# Patient Record
Sex: MALE | NOT HISPANIC OR LATINO | Employment: FULL TIME | URBAN - METROPOLITAN AREA
[De-identification: names, ages, dates, MRNs, and addresses within clinical notes are randomized per-mention and may not be internally consistent; named-entity substitution may affect disease eponyms.]

---

## 2022-05-26 ENCOUNTER — OFFICE VISIT (OUTPATIENT)
Dept: URGENT CARE | Facility: PHYSICIAN GROUP | Age: 32
End: 2022-05-26
Payer: COMMERCIAL

## 2022-05-26 VITALS
BODY MASS INDEX: 20.32 KG/M2 | HEART RATE: 80 BPM | WEIGHT: 150 LBS | TEMPERATURE: 98.9 F | SYSTOLIC BLOOD PRESSURE: 110 MMHG | RESPIRATION RATE: 18 BRPM | OXYGEN SATURATION: 98 % | DIASTOLIC BLOOD PRESSURE: 76 MMHG | HEIGHT: 72 IN

## 2022-05-26 DIAGNOSIS — J02.9 SORE THROAT: ICD-10-CM

## 2022-05-26 DIAGNOSIS — J02.0 STREP THROAT: Primary | ICD-10-CM

## 2022-05-26 LAB
INT CON NEG: NORMAL
INT CON POS: NORMAL
S PYO AG THROAT QL: POSITIVE

## 2022-05-26 PROCEDURE — 99203 OFFICE O/P NEW LOW 30 MIN: CPT | Performed by: NURSE PRACTITIONER

## 2022-05-26 PROCEDURE — 87880 STREP A ASSAY W/OPTIC: CPT | Performed by: NURSE PRACTITIONER

## 2022-05-26 RX ORDER — AZITHROMYCIN 250 MG/1
TABLET, FILM COATED ORAL
Qty: 6 TABLET | Refills: 0 | Status: SHIPPED | OUTPATIENT
Start: 2022-05-26 | End: 2023-09-09

## 2022-05-26 ASSESSMENT — ENCOUNTER SYMPTOMS
DIARRHEA: 0
HOARSE VOICE: 0
TROUBLE SWALLOWING: 0
SWOLLEN GLANDS: 1
HEADACHES: 0
ABDOMINAL PAIN: 0
COUGH: 0
NECK PAIN: 0
STRIDOR: 0
VOMITING: 0
SHORTNESS OF BREATH: 0

## 2022-05-26 NOTE — LETTER
May 26, 2022       Patient: Casey Sean Mentzer   YOB: 1990   Date of Visit: 5/26/2022         To Whom It May Concern:    In my medical opinion, I recommend that Casey Mentzer return to full duty, no restrictions on 05/28/22            Sincerely,          SAIDA Mariano.  Electronically Signed

## 2022-05-26 NOTE — PROGRESS NOTES
Casey Sean Mentzer is a 31 y.o. male who presents for Pharyngitis (Since yesterday. )      Pharyngitis   This is a new problem. The current episode started in the past 7 days. The problem has been gradually worsening. Neither side of throat is experiencing more pain than the other. There has been no fever. The pain is at a severity of 4/10. The pain is moderate. Associated symptoms include swollen glands. Pertinent negatives include no abdominal pain, congestion, coughing, diarrhea, drooling, ear discharge, ear pain, headaches, hoarse voice, plugged ear sensation, neck pain, shortness of breath, stridor, trouble swallowing or vomiting. He has had no exposure to strep or mono. He has tried NSAIDs and gargles for the symptoms. The treatment provided mild relief.       Review of Systems   HENT: Negative for congestion, drooling, ear discharge, ear pain, hoarse voice and trouble swallowing.    Respiratory: Negative for cough, shortness of breath and stridor.    Gastrointestinal: Negative for abdominal pain, diarrhea and vomiting.   Musculoskeletal: Negative for neck pain.   Neurological: Negative for headaches.       Allergies:       Allergies   Allergen Reactions   • Amoxicillin        PMSFS Hx:  Past Medical History:   Diagnosis Date   • Migraines    • Tinea versicolor      History reviewed. No pertinent surgical history.  Family History   Problem Relation Age of Onset   • Genetic Disorder Mother         migraines     Social History     Tobacco Use   • Smoking status: Former Smoker     Packs/day: 0.30     Types: Cigarettes     Quit date: 7/1/2011     Years since quitting: 10.9   • Smokeless tobacco: Never Used   Substance Use Topics   • Alcohol use: Yes       Problems:   There is no problem list on file for this patient.      Medications:   Current Outpatient Medications on File Prior to Visit   Medication Sig Dispense Refill   • sumatriptan (IMITREX) 100 MG tablet Take 1 Tab by mouth Once PRN for Migraine (may repeat  after 2 hours if needed. ) for 1 dose. 9 Tab 3     No current facility-administered medications on file prior to visit.          Objective:     /76   Pulse 80   Temp 37.2 °C (98.9 °F) (Temporal)   Resp 18   Ht 1.829 m (6')   Wt 68 kg (150 lb)   SpO2 98%   BMI 20.34 kg/m²     Physical Exam  Vitals and nursing note reviewed.   Constitutional:       General: He is not in acute distress.     Appearance: Normal appearance. He is well-developed and normal weight.   HENT:      Head: Normocephalic.      Nose: Nose normal.      Mouth/Throat:      Mouth: Mucous membranes are moist.      Pharynx: Posterior oropharyngeal erythema present. No oropharyngeal exudate.   Eyes:      Pupils: Pupils are equal, round, and reactive to light.   Cardiovascular:      Rate and Rhythm: Normal rate and regular rhythm.   Pulmonary:      Effort: Pulmonary effort is normal.   Musculoskeletal:      Cervical back: Normal range of motion and neck supple.   Lymphadenopathy:      Head:      Right side of head: Tonsillar adenopathy present.      Left side of head: No tonsillar adenopathy.      Cervical: Cervical adenopathy present.      Right cervical: Superficial cervical adenopathy present.      Left cervical: Superficial cervical adenopathy present.   Skin:     General: Skin is warm and dry.   Neurological:      Mental Status: He is alert and oriented to person, place, and time.   Psychiatric:         Speech: Speech normal.         Behavior: Behavior normal. Behavior is cooperative.     Rapid Strep A : positive      Assessment /Associated Orders:      1. Strep throat  azithromycin (ZITHROMAX) 250 MG Tab   2. Sore throat  POCT Rapid Strep A       Medical Decision Making:    Pt is clinically stable at today's acute urgent care visit.  No acute distress noted. Appropriate for outpatient management at this time.   Acute problem today with uncertain prognosis.    Educated in proper administration of medication(s) ordered today including  safety, possible SE, risks, benefits, rationale and alternatives to therapy.   Keep well hydrated  Salt water gargles BID and prn. Suggested 1/4 to 1/2 teaspoon (1.5 to 3.0 g) of salt per one cup (8 ounces or 250 mL) of warm water.   OTC throat analgesic spray or lozenge of choice prn throat pain. Dosage and directions per   Educated in infection control practices.       FU prn new or worsening sx. Or if no improvement in symptoms.

## 2022-10-24 ENCOUNTER — HOSPITAL ENCOUNTER (OUTPATIENT)
Facility: MEDICAL CENTER | Age: 32
End: 2022-10-24
Attending: PHYSICIAN ASSISTANT
Payer: COMMERCIAL

## 2022-10-24 ENCOUNTER — OFFICE VISIT (OUTPATIENT)
Dept: URGENT CARE | Facility: PHYSICIAN GROUP | Age: 32
End: 2022-10-24
Payer: COMMERCIAL

## 2022-10-24 VITALS
SYSTOLIC BLOOD PRESSURE: 110 MMHG | RESPIRATION RATE: 12 BRPM | TEMPERATURE: 98.7 F | OXYGEN SATURATION: 98 % | HEART RATE: 97 BPM | HEIGHT: 72 IN | DIASTOLIC BLOOD PRESSURE: 64 MMHG | WEIGHT: 154 LBS | BODY MASS INDEX: 20.86 KG/M2

## 2022-10-24 DIAGNOSIS — R30.0 DYSURIA: ICD-10-CM

## 2022-10-24 LAB
AMBIGUOUS DTTM AMBI4: NORMAL
AMBIGUOUS DTTM AMBI4: NORMAL
APPEARANCE UR: NORMAL
BILIRUB UR STRIP-MCNC: NEGATIVE MG/DL
COLOR UR AUTO: NORMAL
GLUCOSE UR STRIP.AUTO-MCNC: NEGATIVE MG/DL
KETONES UR STRIP.AUTO-MCNC: NORMAL MG/DL
LEUKOCYTE ESTERASE UR QL STRIP.AUTO: NEGATIVE
NITRITE UR QL STRIP.AUTO: NEGATIVE
PH UR STRIP.AUTO: 5.5 [PH] (ref 5–8)
PROT UR QL STRIP: NEGATIVE MG/DL
RBC UR QL AUTO: NEGATIVE
SP GR UR STRIP.AUTO: 1.02
UROBILINOGEN UR STRIP-MCNC: 1 MG/DL

## 2022-10-24 PROCEDURE — 87491 CHLMYD TRACH DNA AMP PROBE: CPT

## 2022-10-24 PROCEDURE — 87591 N.GONORRHOEAE DNA AMP PROB: CPT

## 2022-10-24 PROCEDURE — 99213 OFFICE O/P EST LOW 20 MIN: CPT | Performed by: PHYSICIAN ASSISTANT

## 2022-10-24 PROCEDURE — 81002 URINALYSIS NONAUTO W/O SCOPE: CPT | Performed by: PHYSICIAN ASSISTANT

## 2022-10-24 PROCEDURE — 87086 URINE CULTURE/COLONY COUNT: CPT

## 2022-10-24 NOTE — PROGRESS NOTES
Subjective:   Casey Sean Mentzer is a 32 y.o. male who presents for UTI (Pain with urination x2 days )      HPI  The patient presents to the Urgent Care with complaints of burning urination onset 2 days ago.  He reports of dysuria every time he urinates.  He has no other symptoms.  He denies any known trauma or injury.  He is sexually active in a monogamous relationship with a female unprotected.  Denies any lesions, rash, penile discharge or itching.  Denies any urinary urgency or frequency.  Denies any fevers, chills, abdominal pain, nausea, vomiting, flank pain, testicle pain.        Medications:    azithromycin Tabs  sumatriptan    Allergies: Amoxicillin    Problem List: Casey Sean Mentzer does not have a problem list on file.    Surgical History:  No past surgical history on file.    Past Social Hx: Casey Sean Mentzer  reports that he quit smoking about 11 years ago. His smoking use included cigarettes. He smoked an average of .3 packs per day. He has never used smokeless tobacco. He reports current alcohol use. He reports that he does not use drugs.     Past Family Hx:  Casey Sean Mentzer family history includes Genetic Disorder in his mother.     Problem list, medications, and allergies reviewed by myself today in Epic.     Objective:     /64   Pulse 97   Temp 37.1 °C (98.7 °F) (Temporal)   Resp 12   Ht 1.829 m (6')   Wt 69.9 kg (154 lb)   SpO2 98%   BMI 20.89 kg/m²     Physical Exam  Vitals reviewed.   Constitutional:       General: He is not in acute distress.     Appearance: Normal appearance. He is not ill-appearing or toxic-appearing.   Eyes:      Conjunctiva/sclera: Conjunctivae normal.      Pupils: Pupils are equal, round, and reactive to light.   Cardiovascular:      Rate and Rhythm: Normal rate.   Pulmonary:      Effort: Pulmonary effort is normal.   Abdominal:      General: Abdomen is flat. Bowel sounds are normal. There is no distension.      Palpations: Abdomen is soft. There is no  mass.      Tenderness: There is no abdominal tenderness. There is no guarding or rebound.   Musculoskeletal:      Cervical back: Neck supple.   Skin:     General: Skin is warm and dry.   Neurological:      General: No focal deficit present.      Mental Status: He is alert and oriented to person, place, and time.   Psychiatric:         Mood and Affect: Mood normal.         Behavior: Behavior normal.     UA: Trace ketones otherwise WNL.     Diagnosis and associated orders:     1. Dysuria  - POCT Urinalysis  - URINE CULTURE(NEW); Future  - Chlamydia/GC, PCR (Urine); Future     Comments/MDM:     Patient with dysuria for 2 days.  He has no other symptoms.  See full history above.  UA unremarkable.  Low risk for STD, however we will further evaluate with urine culture and chlamydia/GC.  Discussed differential diagnosis such as nonspecific urethritis, candidal infection.  In the meantime, recommend increase fluid intake, ibuprofen, keep very hydrated with fragrance free lotion or petroleum jelly.  He may try clotrimazole cream twice daily.       I personally reviewed prior external notes and test results pertinent to today's visit. Pathogenesis of diagnosis discussed including typical length and natural progression. Supportive care, natural history, differential diagnoses, and indications for immediate follow-up discussed. Patient expresses understanding and agrees to plan. Patient denies any other questions or concerns.     Follow-up with the primary care physician for recheck, reevaluation, and consideration of further management.    Please note that this dictation was created using voice recognition software. I have made a reasonable attempt to correct obvious errors, but I expect that there are errors of grammar and possibly content that I did not discover before finalizing the note.    This note was electronically signed by Sachin Goncalves PA-C

## 2022-10-25 LAB
C TRACH DNA SPEC QL NAA+PROBE: NEGATIVE
N GONORRHOEA DNA SPEC QL NAA+PROBE: NEGATIVE
SPECIMEN SOURCE: NORMAL

## 2022-10-27 LAB
BACTERIA UR CULT: NORMAL
SIGNIFICANT IND 70042: NORMAL
SITE SITE: NORMAL
SOURCE SOURCE: NORMAL

## 2023-09-09 ENCOUNTER — OFFICE VISIT (OUTPATIENT)
Dept: URGENT CARE | Facility: PHYSICIAN GROUP | Age: 33
End: 2023-09-09
Payer: COMMERCIAL

## 2023-09-09 VITALS
TEMPERATURE: 99.4 F | RESPIRATION RATE: 20 BRPM | WEIGHT: 156.2 LBS | BODY MASS INDEX: 21.16 KG/M2 | HEART RATE: 95 BPM | OXYGEN SATURATION: 97 % | DIASTOLIC BLOOD PRESSURE: 70 MMHG | SYSTOLIC BLOOD PRESSURE: 110 MMHG | HEIGHT: 72 IN

## 2023-09-09 DIAGNOSIS — J02.8 VIRAL SORE THROAT: ICD-10-CM

## 2023-09-09 DIAGNOSIS — J02.9 ACUTE PHARYNGITIS, UNSPECIFIED ETIOLOGY: ICD-10-CM

## 2023-09-09 DIAGNOSIS — B97.89 VIRAL SORE THROAT: ICD-10-CM

## 2023-09-09 LAB — S PYO DNA SPEC NAA+PROBE: NOT DETECTED

## 2023-09-09 PROCEDURE — 1125F AMNT PAIN NOTED PAIN PRSNT: CPT | Performed by: PHYSICIAN ASSISTANT

## 2023-09-09 PROCEDURE — 99213 OFFICE O/P EST LOW 20 MIN: CPT | Performed by: PHYSICIAN ASSISTANT

## 2023-09-09 PROCEDURE — 3074F SYST BP LT 130 MM HG: CPT | Performed by: PHYSICIAN ASSISTANT

## 2023-09-09 PROCEDURE — 87651 STREP A DNA AMP PROBE: CPT | Performed by: PHYSICIAN ASSISTANT

## 2023-09-09 PROCEDURE — 3078F DIAST BP <80 MM HG: CPT | Performed by: PHYSICIAN ASSISTANT

## 2023-09-09 ASSESSMENT — ENCOUNTER SYMPTOMS
HEADACHES: 0
CHILLS: 0
STRIDOR: 0
SORE THROAT: 1
HOARSE VOICE: 0
TROUBLE SWALLOWING: 0
VOMITING: 0
COUGH: 0
FEVER: 0
SWOLLEN GLANDS: 1

## 2023-09-09 ASSESSMENT — PAIN SCALES - GENERAL: PAINLEVEL: 3=SLIGHT PAIN

## 2023-09-09 NOTE — PROGRESS NOTES
Subjective     Casey Sean Mentzer is a 33 y.o. male who presents with Pharyngitis (Per pt, started x2 days ago)    PMH:  has a past medical history of Migraines and Tinea versicolor.  MEDS:   Current Outpatient Medications:     sumatriptan (IMITREX) 100 MG tablet, Take 1 Tab by mouth Once PRN for Migraine (may repeat after 2 hours if needed. ) for 1 dose., Disp: 9 Tab, Rfl: 3  ALLERGIES:   Allergies   Allergen Reactions    Amoxicillin      SURGHX: History reviewed. No pertinent surgical history.  SOCHX:  reports that he quit smoking about 12 years ago. His smoking use included cigarettes. He has never used smokeless tobacco. He reports current alcohol use. He reports that he does not use drugs.  FH: Reviewed with patient, not pertinent to this visit.           Patient presents clinic today with complaint of sore throat that started 2 days ago.  Patient denies congestion, cough, runny nose or any other respiratory symptoms.  Patient would like to be tested for strep throat.    Pharyngitis   This is a new problem. The current episode started in the past 7 days. The problem has been gradually worsening. Neither side of throat is experiencing more pain than the other. There has been no fever. The pain is moderate. Associated symptoms include swollen glands. Pertinent negatives include no congestion, coughing, headaches, hoarse voice, plugged ear sensation, stridor, trouble swallowing or vomiting. He has tried cool liquids, NSAIDs and gargles for the symptoms. The treatment provided no relief.       Review of Systems   Constitutional:  Negative for chills and fever.   HENT:  Positive for sore throat. Negative for congestion, hoarse voice and trouble swallowing.    Respiratory:  Negative for cough and stridor.    Gastrointestinal:  Negative for vomiting.   Neurological:  Negative for headaches.   All other systems reviewed and are negative.             Objective     /70 (BP Location: Left arm, Patient Position:  Sitting, BP Cuff Size: Adult)   Pulse 95   Temp 37.4 °C (99.4 °F) (Temporal)   Resp 20   Ht 1.829 m (6')   Wt 70.9 kg (156 lb 3.2 oz)   SpO2 97%   BMI 21.18 kg/m²      Physical Exam  Vitals and nursing note reviewed.   Constitutional:       General: He is not in acute distress.     Appearance: Normal appearance. He is well-developed and normal weight. He is not ill-appearing or toxic-appearing.   HENT:      Head: Normocephalic and atraumatic.      Right Ear: Tympanic membrane normal.      Left Ear: Tympanic membrane normal.      Nose: Nose normal.      Mouth/Throat:      Lips: Pink.      Mouth: Mucous membranes are moist.      Pharynx: Oropharynx is clear. Uvula midline. Posterior oropharyngeal erythema present. No oropharyngeal exudate.   Eyes:      Extraocular Movements: Extraocular movements intact.      Conjunctiva/sclera: Conjunctivae normal.      Pupils: Pupils are equal, round, and reactive to light.   Cardiovascular:      Rate and Rhythm: Normal rate and regular rhythm.      Pulses: Normal pulses.      Heart sounds: Normal heart sounds.   Pulmonary:      Effort: Pulmonary effort is normal.      Breath sounds: Normal breath sounds.   Abdominal:      General: Bowel sounds are normal.      Palpations: Abdomen is soft.   Musculoskeletal:         General: Normal range of motion.      Cervical back: Normal range of motion and neck supple.   Skin:     General: Skin is warm and dry.      Capillary Refill: Capillary refill takes less than 2 seconds.   Neurological:      General: No focal deficit present.      Mental Status: He is alert and oriented to person, place, and time.      Cranial Nerves: No cranial nerve deficit.      Motor: Motor function is intact.      Coordination: Coordination is intact.      Gait: Gait normal.   Psychiatric:         Mood and Affect: Mood normal.                             Assessment & Plan                  1. Sore throat  POCT GROUP A STREP, PCR        Strep:  negative    Discussed that I felt this was viral in nature. Did not see any evidence of a bacterial process. Discussed natural progression and sx care.      PT advised saltwater gargles/swishes  3-4 times daily until symptoms improve.     Motrin/Advil/Ibuprophen 600 mg every 6 hours as needed for pain or fever.    Differential diagnosis, supportive care, and indications for immediate follow-up discussed with patient.  Instructed to return to clinic or nearest emergency department for any change in condition, further concerns, or worsening of symptoms.    I personally reviewed prior external notes and test results pertinent to today's visit.  I have independently reviewed and interpreted all diagnostics ordered during this urgent care visit.    PT should follow up with PCP in 1-2 days for re-evaluation if symptoms have not improved.      Discussed red flags and reasons to return to UC or ED.      Pt and/or family verbalized understanding of diagnosis and follow up instructions and was offered informational handout on diagnosis.  PT discharged.     Please note that this dictation was created using voice recognition software. I have made every reasonable attempt to correct obvious errors, but I expect that there may be errors of grammar and possibly content that I did not discover before finalizing the note.